# Patient Record
Sex: MALE | Race: BLACK OR AFRICAN AMERICAN | Employment: OTHER | URBAN - METROPOLITAN AREA
[De-identification: names, ages, dates, MRNs, and addresses within clinical notes are randomized per-mention and may not be internally consistent; named-entity substitution may affect disease eponyms.]

---

## 2019-04-12 ENCOUNTER — APPOINTMENT (OUTPATIENT)
Dept: GENERAL RADIOLOGY | Age: 24
End: 2019-04-12
Attending: NURSE PRACTITIONER
Payer: OTHER GOVERNMENT

## 2019-04-12 ENCOUNTER — HOSPITAL ENCOUNTER (EMERGENCY)
Age: 24
Discharge: HOME | End: 2019-04-12
Attending: EMERGENCY MEDICINE
Payer: OTHER GOVERNMENT

## 2019-04-12 VITALS
HEART RATE: 70 BPM | BODY MASS INDEX: 39.24 KG/M2 | OXYGEN SATURATION: 100 % | DIASTOLIC BLOOD PRESSURE: 84 MMHG | RESPIRATION RATE: 14 BRPM | HEIGHT: 67 IN | SYSTOLIC BLOOD PRESSURE: 133 MMHG | TEMPERATURE: 98.5 F | WEIGHT: 250 LBS

## 2019-04-12 DIAGNOSIS — S60.121A CONTUSION OF RIGHT INDEX FINGER WITH DAMAGE TO NAIL, INITIAL ENCOUNTER: Primary | ICD-10-CM

## 2019-04-12 PROCEDURE — 99283 EMERGENCY DEPT VISIT LOW MDM: CPT

## 2019-04-12 PROCEDURE — 77030008304 HC SPLNT FNGR ALUM DERY -A

## 2019-04-12 PROCEDURE — 73140 X-RAY EXAM OF FINGER(S): CPT

## 2019-04-12 PROCEDURE — 74011250637 HC RX REV CODE- 250/637: Performed by: NURSE PRACTITIONER

## 2019-04-12 RX ORDER — IBUPROFEN 600 MG/1
600 TABLET ORAL
Status: COMPLETED | OUTPATIENT
Start: 2019-04-12 | End: 2019-04-12

## 2019-04-12 RX ORDER — IBUPROFEN 600 MG/1
600 TABLET ORAL
Qty: 20 TAB | Refills: 0 | Status: SHIPPED | OUTPATIENT
Start: 2019-04-12 | End: 2019-04-12

## 2019-04-12 RX ORDER — IBUPROFEN 600 MG/1
600 TABLET ORAL
Qty: 20 TAB | Refills: 0 | Status: SHIPPED | OUTPATIENT
Start: 2019-04-12

## 2019-04-12 RX ADMIN — IBUPROFEN 600 MG: 600 TABLET, FILM COATED ORAL at 10:36

## 2019-04-12 NOTE — DISCHARGE INSTRUCTIONS
Follow-up as directed  Take Tylenol or Motrin for pain  Apply ice to the area  May keep your hand above your heart to decrease the throbbing sensation  Return to the emergency department for increased pain, swelling, numbness or tingling, open area, erythema or worsening of symptoms

## 2019-04-12 NOTE — ED PROVIDER NOTES
EMERGENCY DEPARTMENT HISTORY AND PHYSICAL EXAM 
 
Date: 4/12/2019 Patient Name: Jaziel Jefferson History of Presenting Illness Chief Complaint Patient presents with  Finger Pain History Provided By: Patient Additional History (Context):  
10:33 AM 
Jaziel Jefferson is a 24 y.o. male with PMHX  presents to the ED c/o right second finger pain after closing it in a door prior to arrival.  The patient states the pain is a 10 out of 10 that is a throbbing sensation and is constant. He did not take any medications prior to arrival.  Unsure when his last tetanus shot was. Pt denies numbness, previous injury and any other sxs or complaints. PCP: Other, MD Ezra 
 
 
 
Past History Past Medical History: 
History reviewed. No pertinent past medical history. Past Surgical History: 
History reviewed. No pertinent surgical history. Family History: 
History reviewed. No pertinent family history. Social History: 
Social History Tobacco Use  Smoking status: Never Smoker  Smokeless tobacco: Current User Substance Use Topics  Alcohol use: Never Frequency: Never  Drug use: Never Allergies: Allergies Allergen Reactions  Pineapple Hives Review of Systems Review of Systems Musculoskeletal: Positive for arthralgias. Positive right second digit pain Skin: Positive for wound. Positive small abrasion to the right second digit fingernail Neurological: Negative for numbness. Hematological: Does not bruise/bleed easily. All other systems reviewed and are negative. Physical Exam  
 
Vitals:  
 04/12/19 1029 04/12/19 1117 BP: (!) 144/101 133/84 Pulse: 76 70 Resp: 14 Temp: 98.5 °F (36.9 °C) SpO2: 100% Weight: 113.4 kg (250 lb) Height: 5' 7\" (1.702 m) Physical Exam  
Constitutional: He is oriented to person, place, and time. He appears well-developed and well-nourished.   
HENT:  
 Head: Normocephalic and atraumatic. Eyes: Conjunctivae are normal.  
Neck: Normal range of motion. Neck supple. Pulmonary/Chest: Effort normal.  
Musculoskeletal: Normal range of motion. Planing of right second digit pain, moderate swelling to the finger, no obvious deformity, small amount of bleeding to the lateral aspect of the fingernail, bruising under the fingernail noted, fingernail is grossly intact, able to flex and extend without difficulty, cap refill less than 3 seconds distally as well as sensation is intact, no other open areas noted Neurological: He is alert and oriented to person, place, and time. Skin: Skin is warm and dry. Diagnostic Study Results Labs: 
 No results found for this or any previous visit (from the past 12 hour(s)). Radiologic Studies:  
 
10:33 AM 
RADIOLOGY FINDINGS 
 
XR 2ND FINGER RT MIN 2 V Final Result Impression: 1. No acute bony abnormality is identified in visualized extent. CT Results  (Last 48 hours) None CXR Results  (Last 48 hours) None Medical Decision Making I am the first provider for this patient. I reviewed the vital signs, available nursing notes, past medical history, past surgical history, family history and social history. Vital Signs: Reviewed the patient's vital signs. Pulse Oximetry Analysis: 100% on RA Records Reviewed: REVIEWED OLD RECORDS AND NURSING NOTES Procedures: 
Procedures ED Course: 
10:33 AM: Initial Contact 11:30 AM: Patient updated on all results, they understand reasons to return and are offering no further questions or concerns at this time.  
 
Provider Notes (Medical Decision Making): Patient presents emergency department complaining of right second digit pain after closing it in a door prior to arrival.  There is no signs of neurovascular compromise on exam.  There is only slight bleeding from the fingernail site but the fingernail is grossly intact. X-ray shows no acute process. Patient was given a splint and will follow up with [facility] as needed. He understands reasons to return and is agreeable treatment plan. Diagnosis and Disposition Discharge Note: 
11:30 AM: 
Nancy Hankins's  results have been reviewed with him. He has been counseled regarding his diagnosis, treatment, and plan. He verbally conveys understanding and agreement of the signs, symptoms, diagnosis, treatment and prognosis and additionally agrees to follow up as discussed. He also agrees with the care-plan and conveys that all of his questions have been answered. I have also provided discharge instructions for him that include: educational information regarding their diagnosis and treatment, and list of reasons why they would want to return to the ED prior to their follow-up appointment, should his condition change. He has been provided with education for proper emergency department utilization. Clinical Impression: 1. Contusion of right index finger with damage to nail, initial encounter Plan: 
1. D/C Home 2. Discharge Medication List as of 4/12/2019 11:29 AM  
  
START taking these medications Details  
ibuprofen (MOTRIN) 600 mg tablet Take 1 Tab by mouth every six (6) hours as needed for Pain., Print, Disp-20 Tab, R-0  
  
  
 
3. Follow-up Information Follow up With Specialties Details Why Contact Info [facility]  Schedule an appointment as soon as possible for a visit in 3 days Or with your PCP  [address], [address] [place] [address] [address] [phone] [facility] EMERGENCY DE[facility] Emergency Medicine  As needed, If symptoms worsen 2 Marlyn Desir 
[address] [address] [phone] Please note that this dictation was completed with [employer], the [employer] voice recognition software.   Quite often unanticipated grammatical, syntax, homophones, and other interpretive errors are inadvertently transcribed by the computer software. Please disregard these errors. Please excuse any errors that have escaped final proofreading.  
 
JONG Shay

## 2019-04-12 NOTE — ED TRIAGE NOTES
Pt ambulated into er with complaints of 2nd finger pain on right hand, pt slammed finger in back door 20 minutes prior to arrival. Skin intact, no obvious deformity noted.